# Patient Record
Sex: MALE | Race: WHITE
[De-identification: names, ages, dates, MRNs, and addresses within clinical notes are randomized per-mention and may not be internally consistent; named-entity substitution may affect disease eponyms.]

---

## 2020-03-13 ENCOUNTER — HOSPITAL ENCOUNTER (EMERGENCY)
Dept: HOSPITAL 25 - UCEAST | Age: 55
Discharge: HOME | End: 2020-03-13
Payer: COMMERCIAL

## 2020-03-13 VITALS — DIASTOLIC BLOOD PRESSURE: 89 MMHG | SYSTOLIC BLOOD PRESSURE: 128 MMHG

## 2020-03-13 DIAGNOSIS — B34.9: Primary | ICD-10-CM

## 2020-03-13 PROCEDURE — 87651 STREP A DNA AMP PROBE: CPT

## 2020-03-13 PROCEDURE — 99201: CPT

## 2020-03-13 PROCEDURE — G0463 HOSPITAL OUTPT CLINIC VISIT: HCPCS

## 2020-03-13 NOTE — UC
UC General HPI





- HPI Summary


HPI Summary: 


PATIENT TRAVELED TO [facility] VIA AN AIRPORT IN [facility] 6-1/2 WEEKS AGO.  

RETURNED TO [place] 4-1/2 WEEKS AGO ON 2/11/2020.  ABOUT 1 WEEK LATER PATIENT 

DEVELOPED SOME CONGESTION, FATIGUE AND BODY ACHES.  NO FEVER OR SIGNIFICANT 

COUGH.  NO NAUSEA/VOMITING.  THE SYMPTOMS HAVE BEEN PERSISTENT FOR THESE PAST 

FEW WEEKS UNTIL PATIENT CAME IN FOR EVALUATION.








- History of Current Complaint


Chief Complaint: UCRespiratory


Stated Complaint: CONGESTION,ACHES


Hx Obtained From: Patient


Onset/Duration: Gradual Onset, Lasting Weeks, Still Present


Timing: Constant


Onset Severity: Mild


Current Severity: Mild


Pain Intensity: 2


Associated Signs & Symptoms: Negative: Cough, Fever, Headache, Nausea, SOB, 

Vomiting





- Allergy/Home Medications


Allergies/Adverse Reactions: 


 Allergies











Allergy/AdvReac Type Severity Reaction Status Date / Time


 


No Known Allergies Allergy   Verified 03/13/20 13:09











Home Medications: 


 Home Medications





Omega-3 Fatty Acids (Nf) [Fish Oil (NF)] 1,000 mg PO DAILY 04/16/15 [History 

Confirmed 03/13/20]


Acetaminophen TAB* [Tylenol TAB*] 650 mg PO ONCE PRN 03/13/20 [History 

Confirmed 03/13/20]


Ascorbic Acid TAB* [Vitamin C  TAB*] 500 mg PO DAILY 03/13/20 [History 

Confirmed 03/13/20]


Gluc Burkett/Chondro Burkett A/Vit C/Mn [Glucosamine Chondroitin Tab] 1 each PO DAILY 03/ 13/20 [History Confirmed 03/13/20]











PMH/Surg Hx/FS Hx/Imm Hx


Previously Healthy: Yes





- Surgical History


Surgical History: Yes


Surgery Procedure, Year, and Place: RIGHT KNEE ACL REPAIR





- Family History


Known Family History: Positive: Non-Contributory





- Social History


Alcohol Use: Daily


Alcohol Amount: 1 glass/day


Substance Use Type: None


Smoking Status (MU): Never Smoked Tobacco





Review of Systems


All Other Systems Reviewed And Are Negative: Yes


Constitutional: Positive: Fatigue


ENT: Positive: Other - NASAL CONGESTION


Respiratory: Positive: Negative


Cardiovascular: Positive: Negative


Gastrointestinal: Positive: Negative


Musculoskeletal: Positive: Myalgia


Neurological/Mental Status: Negative: Headache





Physical Exam


Triage Information Reviewed: Yes


Appearance: Well-Appearing, No Pain Distress, Well-Nourished


Vital Signs: 


 Initial Vital Signs











Temp  97.4 F   03/13/20 13:04


 


Pulse  75   03/13/20 13:04


 


Resp  16   03/13/20 13:04


 


BP  128/89   03/13/20 13:04


 


Pulse Ox  99   03/13/20 13:04








 Laboratory Tests











  03/13/20 03/13/20





  13:32 13:34


 


Influenza A (Rapid)   Negative


 


Influenza B (Rapid)   Negative


 


Group A Strep Rapid  Negative 











Vital Signs Reviewed: Yes


Eyes: Positive: Conjunctiva Clear


ENT: Positive: Hearing grossly normal, Pharynx normal, TMs normal


Neck: Positive: Supple, Nontender, No Lymphadenopathy


Respiratory Exam: Normal


Cardiovascular Exam: Normal


Abdomen Description: Positive: Soft


Musculoskeletal: Positive: No Edema


Neurological: Positive: Alert


Psychological: Positive: Age Appropriate Behavior


Skin: Negative: Rashes





Course/Dx





- Course


Course Of Treatment: 





FLU NEGATIVE.  STREP NEGATIVE.  PATIENT LIKELY WITH VIRALLY MEDIATED SYMPTOMS 

THAT SHOULD RESOLVE ON THEIR OWN WITH TIME.  REST, HYDRATE, OTC MEDS AS NEEDED.

  FOLLOW-UP IF NOT IMPROVING AS EXPECTED.  INFECTION CONTROL AND HEALTH 

DEPARTMENT CONTACTED GIVEN PATIENT'S RECENT TRAVEL TO [place].

  HE DOES NOT MEET CRITERIA FOR COVID19 TESTING.





- Diagnoses


Provider Diagnosis: 


 Viral syndrome








Discharge ED





- Sign-Out/Discharge


Documenting (check all that apply): Patient Departure


All imaging exams completed and their final reports reviewed: No Studies





- Discharge Plan


Condition: Stable


Disposition: HOME


Patient Education Materials:  Viral Syndrome (ED)


Referrals: 


Zoran Ibarra MD [Primary Care Provider] - If Needed


Additional Instructions: 


STREP NEGATIVE.  FLU NEGATIVE.  THE HEALTH DEPARTMENT WAS CONTACTED GIVEN YOUR 

HISTORY OF RECENT TRAVEL BUT YOU DO NOT MEET CRITERIA FOR CORONA VIRUS TESTING.

  YOUR SYMPTOMS ARE LIKELY VIRALLY MEDIATED AND SHOULD RESOLVE ON THEIR OWN 

WITH TIME.  REST, HYDRATE, OTC MEDS AS NEEDED.  FOLLOW-UP WITH YOUR PCP IF YOUR 

SYMPTOMS ARE NOT IMPROVING OVER THE NEXT FEW WEEKS.





- Billing Disposition and Condition


Condition: STABLE


Disposition: Home